# Patient Record
Sex: FEMALE | Race: WHITE | NOT HISPANIC OR LATINO | Employment: OTHER | URBAN - METROPOLITAN AREA
[De-identification: names, ages, dates, MRNs, and addresses within clinical notes are randomized per-mention and may not be internally consistent; named-entity substitution may affect disease eponyms.]

---

## 2017-04-12 ENCOUNTER — TELEPHONE (OUTPATIENT)
Dept: GASTROENTEROLOGY | Facility: CLINIC | Age: 42
End: 2017-04-12

## 2017-04-12 ENCOUNTER — HOSPITAL ENCOUNTER (OUTPATIENT)
Dept: RADIOLOGY | Facility: HOSPITAL | Age: 42
Discharge: HOME | End: 2017-04-12
Attending: INTERNAL MEDICINE
Payer: COMMERCIAL

## 2017-04-12 ENCOUNTER — OFFICE VISIT (OUTPATIENT)
Dept: GASTROENTEROLOGY | Facility: CLINIC | Age: 42
End: 2017-04-12
Payer: COMMERCIAL

## 2017-04-12 VITALS
DIASTOLIC BLOOD PRESSURE: 68 MMHG | WEIGHT: 138 LBS | SYSTOLIC BLOOD PRESSURE: 118 MMHG | HEIGHT: 64 IN | BODY MASS INDEX: 23.56 KG/M2 | HEART RATE: 49 BPM

## 2017-04-12 DIAGNOSIS — K21.9 GASTROESOPHAGEAL REFLUX DISEASE, ESOPHAGITIS PRESENCE NOT SPECIFIED: ICD-10-CM

## 2017-04-12 DIAGNOSIS — R13.10 ODYNOPHAGIA: Primary | ICD-10-CM

## 2017-04-12 DIAGNOSIS — R13.10 ODYNOPHAGIA: ICD-10-CM

## 2017-04-12 PROCEDURE — 25500020 PHARM REV CODE 255: Performed by: INTERNAL MEDICINE

## 2017-04-12 PROCEDURE — 74220 X-RAY XM ESOPHAGUS 1CNTRST: CPT | Mod: TC

## 2017-04-12 PROCEDURE — 99999 PR PBB SHADOW E&M-NEW PATIENT-LVL III: CPT | Mod: PBBFAC,,, | Performed by: INTERNAL MEDICINE

## 2017-04-12 PROCEDURE — 1160F RVW MEDS BY RX/DR IN RCRD: CPT | Mod: S$GLB,,, | Performed by: INTERNAL MEDICINE

## 2017-04-12 PROCEDURE — 74220 X-RAY XM ESOPHAGUS 1CNTRST: CPT | Mod: 26,,, | Performed by: RADIOLOGY

## 2017-04-12 PROCEDURE — 99204 OFFICE O/P NEW MOD 45 MIN: CPT | Mod: S$GLB,,, | Performed by: INTERNAL MEDICINE

## 2017-04-12 RX ORDER — INSULIN GLARGINE 100 [IU]/ML
INJECTION, SOLUTION SUBCUTANEOUS NIGHTLY
COMMUNITY

## 2017-04-12 RX ORDER — SUCRALFATE 1 G/1
1 TABLET ORAL 4 TIMES DAILY
COMMUNITY

## 2017-04-12 RX ORDER — LIOTHYRONINE SODIUM 5 UG/1
25 TABLET ORAL DAILY
COMMUNITY

## 2017-04-12 RX ORDER — FLUTICASONE PROPIONATE 50 MCG
1 SPRAY, SUSPENSION (ML) NASAL
COMMUNITY
Start: 2016-04-01

## 2017-04-12 RX ORDER — FAMOTIDINE 40 MG/1
40 TABLET, FILM COATED ORAL DAILY
COMMUNITY

## 2017-04-12 RX ORDER — TRAZODONE HYDROCHLORIDE 50 MG/1
50 TABLET ORAL NIGHTLY
COMMUNITY

## 2017-04-12 RX ORDER — INSULIN ASPART 100 [IU]/ML
INJECTION, SOLUTION INTRAVENOUS; SUBCUTANEOUS
COMMUNITY

## 2017-04-12 RX ORDER — LEVOTHYROXINE SODIUM 88 UG/1
88 TABLET ORAL DAILY
COMMUNITY

## 2017-04-12 RX ADMIN — DIATRIZOATE MEGLUMINE AND DIATRIZOATE SODIUM 120 ML: 600; 100 SOLUTION ORAL; RECTAL at 03:04

## 2017-04-12 NOTE — TELEPHONE ENCOUNTER
----- Message from Kim Zepeda sent at 4/12/2017  2:33 PM CDT -----  Hermelindo from [facility] called.   No.  [phone]   Patient said that a swallow study was going to be ordered.    Patient is waiting.   Please call.

## 2017-04-12 NOTE — PROGRESS NOTES
Subjective:       Patient ID: Leny Fragoso is a 41 y.o. female.    Chief Complaint: GI Problem    This is a 41-year-old female here for evaluation of suspected ulcerative esophagitis.  She notes feelings of severe reflux which have been occurring for the past 9 months.  She underwent an upper endoscopy which she states showed esophagitis and gastritis.  A number of PPIs were tried and she reports feeling severe abdominal pain from the medications.  No relief of her symptoms were reported from these.  Which she feels is a severe burning sensation epigastric region radiating up into the mouth with the feeling of acid refluxing.  Some nausea and vomiting which can occur up to 2 hours later.  Initially a trial of Reglan was done empirically.  She states her gastric biopsies were negative for H. pylori though it is unclear if esophageal biopsies were done.  2 more EGDs were done with the last one showing a reported ulcer in the distal esophagus.  No unintentional weight loss.  No particular dietary relation and she is overall a very healthy person who exercises regularly.  Some seasonal ALLERGIES but no other ALLERGIC or atopic conditions.  No weight loss or cough.  During the last EGD she reports dilation being performed and has been feeling significantly worsened that time with odynophagia.    The following portions of the patient's history were reviewed and updated as appropriate: allergies, current medications, past family history, past medical history, past social history, past surgical history and problem list.    (Portions of this note were dictated using voice recognition software and may contain dictation related errors in spelling/grammar/syntax not found on text review)    HPI  Review of Systems   Constitutional: Negative for appetite change, chills and fever.   HENT: Positive for mouth sores and trouble swallowing. Negative for postnasal drip.    Eyes: Negative for pain and redness.   Respiratory: Negative  for cough, choking, chest tightness and shortness of breath.    Cardiovascular: Negative for chest pain and leg swelling.   Gastrointestinal: Negative for abdominal distention, abdominal pain, anal bleeding, nausea and rectal pain.   Endocrine: Negative for cold intolerance and heat intolerance.   Genitourinary: Negative for difficulty urinating and hematuria.   Musculoskeletal: Negative for arthralgias and back pain.   Skin: Negative for color change and pallor.   Allergic/Immunologic: Positive for environmental allergies. Negative for food allergies.   Neurological: Negative for dizziness and light-headedness.   Hematological: Negative for adenopathy. Does not bruise/bleed easily.   Psychiatric/Behavioral: Negative for agitation and behavioral problems.       Objective:      Physical Exam   Constitutional: She is oriented to person, place, and time. She appears well-developed and well-nourished. She appears distressed.   HENT:   Head: Normocephalic and atraumatic.   Eyes: Conjunctivae are normal. No scleral icterus.   Neck: Normal range of motion. Neck supple. No tracheal deviation present. No thyromegaly present.   Cardiovascular: Normal rate and regular rhythm.  Exam reveals no gallop and no friction rub.    No murmur heard.  Pulmonary/Chest: Effort normal. No respiratory distress.   Abdominal: Soft. Bowel sounds are normal. She exhibits no distension. There is no tenderness.   Musculoskeletal:        Right wrist: She exhibits normal range of motion and no tenderness.        Left wrist: She exhibits normal range of motion and no tenderness.   Lymphadenopathy:        Head (right side): No submental and no submandibular adenopathy present.        Head (left side): No submental and no submandibular adenopathy present.   Neurological: She is alert and oriented to person, place, and time.   Skin: Skin is warm and dry. No rash noted. She is not diaphoretic. No erythema.   Psychiatric: She has a normal mood and affect.  Her behavior is normal.   Nursing note and vitals reviewed.      Assessment:       1. Odynophagia    2. Gastroesophageal reflux disease, esophagitis presence not specified        Plan:   1. Outpatient GI records, will likely need esophageal biopsies if not done  2. Likely needs GES, possible pH study/manometry/impedance  3. Continue meds for now, ok to d/c sucralfate as no benefit

## 2017-04-18 ENCOUNTER — TELEPHONE (OUTPATIENT)
Dept: GASTROENTEROLOGY | Facility: CLINIC | Age: 42
End: 2017-04-18

## 2017-04-18 NOTE — TELEPHONE ENCOUNTER
Spoke with patient to inform her of results. Advised her that  would like her to complete a Gastric emptying study. Patient states that she would like to contact me back after finding some dates.    ----- Message from Sonya Barry MD sent at 4/12/2017  9:41 PM CDT -----  No extravasation to suggest a perforation during her EGD/dilation. Can we order a gastric emptying study (four hour), this can be done closer to her home. Additionally I need her records from her GI MD as well asap